# Patient Record
(demographics unavailable — no encounter records)

---

## 2025-04-16 NOTE — HISTORY OF PRESENT ILLNESS
[Result of repetitive motion] : result of repetitive motion [3] : 3 [6] : 6 [Dull/Aching] : dull/aching [Constant] : constant [Sleep] : sleep [Social interactions] : social interactions [Retired] : Work status: retired [de-identified] : 04/16/2025:  JUANIS FARRELL, a86 year old female, presents today for right shoulder pain for a while but worse since 4/15/25.  No specific injury or trauma but possible exacerbation due to getting herself in a car.  Reports to noticing a bump/swelling in the right shoulder as well.  Pain mostly lateral and on top of the shoulder.  Pain worse with activities.  No shooting pains.  No numbness or tingling.  No fevers or chills.  Rest with little relief.   History of right shoulder OA - treated with PT and csi  Pmhx - borderline glaucoma, htn, high cholesterol, gerd.  [] : Post Surgical Visit: no

## 2025-04-16 NOTE — ASSESSMENT
[FreeTextEntry1] : R shoulder DJD.   Also R AC joint cyst - nontender on exam.    Xrays and advanced imaging reviewed. The patient is indicated for shoulder replacement.  Discussed op versus non op tx, including the r/b/a/c of both. Discussed rehab and recovery after TSA/RSA. Discussed timing, frequency and efficacy of cortisone injections. Discussed risks of repeated cortisone injections. Discussed trial of visco supplementation. R AC joint cyst - nontender.  Defers aspiration today.  Offered CSI R GH today.  She defers as well.  Rest, ice, activity modification.  She defers Nsaids prn.  Advised Tylenol prn.   Follow up in 6 weeks.

## 2025-04-16 NOTE — PHYSICAL EXAM
[Right] : right shoulder [] : motor and sensory intact distally [FreeTextEntry3] : AC joint swelling consistent with cyst from AC OA [FreeTextEntry9] :  ER 40